# Patient Record
(demographics unavailable — no encounter records)

---

## 2025-01-23 NOTE — PHYSICAL EXAM
[FreeTextEntry1] : Gen: Patient is A&O x 3, NAD HEENT: EOMI, hearing grossly normal Resp: regular, non - labored CV: pulses regular Skin: no rashes, erythema Lymph: no clubbing, cyanosis, no edema in LUE Inspection: no instability, palpable nodule in left medial arm/elbow ROM: full throughout, no painful arc  Palpation: TTP left pectoral muscles  Sensation: intact to light touch Reflexes: 1+ and symmetric throughout Strength: 5/5 throughout Special tests: -Left Galdamez sign, -Empty can sign Gait: normal, non-antalgic  Measurements were taken of the bilateral upper extremities which were as follows:  Left: 15 cm above the elbow: 25 cm 25 cm 25 cm 10 cm above the elbow: 24 cm 23.5 cm 23.5 cm  10 cm below the elbow: 19.5 cm 19 cm 19 cm  15 cm below the elbow: 17 cm 16 cm 16 cm wrist (at the ulnar styloid): 14 cm 15 cm 15 cm Hand: 17 cm 17.5 cm 17 cm  base of 2nd digit: 6 cm 5.5 cm 5.5 cm   Right: 15 cm above the elbow: 25 cm 10 cm above the elbow: 24 cm 10 cm below the elbow: 19 cm 15 cm below the elbow: 15 cm wrist (at the ulnar styloid): 14 cm Hand: 18 cm  base of 2nd digit: 5.5 cm

## 2025-01-23 NOTE — ASSESSMENT
[FreeTextEntry1] : 65 year old female presenting for evaluation.  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

## 2025-01-23 NOTE — HISTORY OF PRESENT ILLNESS
[FreeTextEntry1] : Ms. Rivers is a 65 year old female with history of left breast cancer.  She is s/p left breast partial mastectomy 0/5 SLNB in March 2022, radiation therapy completed in May 2022.  Pending starting tamoxifen.  Since last visit she completed PT.  Reports still has some tightness in chest wall.  Venlafaxine helping with hot flashes and mood.  No side effects.  No edema or erythema in UE.  Still feels nodule in left arm, reports stable.

## 2025-04-24 NOTE — DATA REVIEWED
[FreeTextEntry1] : Ultrasound left elbow March 2025: No masses are seen at the site of concern involving the medial portion of the antecubital fossa.  There is no subcutaneous edema.  Asymmetric prominence of the left brachialis muscle at the site of concern when compared to the right side.  No masses are seen within the muscle or overlying subcutaneous tissues.

## 2025-04-24 NOTE — ASSESSMENT
[FreeTextEntry1] : 65 year old female presenting for evaluation.  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 -Next surveillance in October 2025   Follow up in 6 months or sooner as needed.  The patient had a follow-up SOZO measurement which I reviewed today.  Bioimpedance spectroscopy helps identify the onset of lymphedema in an arm or leg before patients experience noticeable swelling. Research has shown that the early detection of lymphedema using L-Dex combined with treatment can reduce progression to chronic lymphedema by 95% in breast cancer patients. Whenever possible, patients are tested for baseline L-Dex score before cancer treatment begins and then are reassessed during regular follow-up visits using the SOZO device. Otherwise, this can be started postoperatively and continued during regular follow-up visits. If the patients L-Dex score increases above normal levels, that is a sign that lymphedema is developing, and a referral is made to physical therapy for further evaluation and/or early compression treatment. Lymphedema assessment with the SOZO L-Dex score is recommended to be done every 3 months for the first 3 years and then every 6 months for years 4 and 5, followed by annually afterwards.

## 2025-04-24 NOTE — HISTORY OF PRESENT ILLNESS
[FreeTextEntry1] : Ms. Rivers is a 65 year old female with history of left breast cancer.  She is s/p left breast partial mastectomy 0/5 SLNB in March 2022, radiation therapy completed in May 2022.  On anastrazole.   She reports that venlafaxine still is helping with hot flashes.  No side effects to this.  She still has some tightness in her left chest wall.  Doing home exercise stretching.  No erythema or increased warmth or swelling in upper extremity.  She reports that nodule in her left arm is unchanged no pain in this region.

## 2025-04-24 NOTE — PHYSICAL EXAM
[FreeTextEntry1] : Gen: Patient is A&O x 3, NAD HEENT: EOMI, hearing grossly normal Resp: regular, non - labored CV: pulses regular Skin: no rashes, erythema Lymph: no clubbing, cyanosis, no edema in LUE Inspection: no instability, palpable nodule in left medial arm/elbow ROM: full throughout, no painful arc  Palpation: TTP left pectoral muscles  Sensation: intact to light touch Reflexes: 1+ and symmetric throughout Strength: 5/5 throughout Special tests: -Left Galdamez sign, -Empty can sign Gait: normal, non-antalgic  Bioimpedance spectroscopy performed today with L-Dex -1.9 LUE (post-op baseline -3.6))